# Patient Record
Sex: MALE | Race: BLACK OR AFRICAN AMERICAN
[De-identification: names, ages, dates, MRNs, and addresses within clinical notes are randomized per-mention and may not be internally consistent; named-entity substitution may affect disease eponyms.]

---

## 2019-07-15 ENCOUNTER — HOSPITAL ENCOUNTER (OUTPATIENT)
Dept: HOSPITAL 50 - VM.SDS | Age: 49
Discharge: HOME | End: 2019-07-15
Attending: SURGERY
Payer: COMMERCIAL

## 2019-07-15 DIAGNOSIS — D12.5: Primary | ICD-10-CM

## 2019-07-15 DIAGNOSIS — R73.03: ICD-10-CM

## 2019-07-15 PROCEDURE — 45384 COLONOSCOPY W/LESION REMOVAL: CPT

## 2019-07-15 NOTE — OR
PREOPERATIVE DIAGNOSIS:  Positive FIT test.

 

POSTOPERATIVE DIAGNOSIS:  Positive FIT test.

 

PROCEDURE PERFORMED:  Colonoscopy with polypectomy.

 

INDICATION:  The patient is a 49-year-old male with a history of positive FIT

test, presents for colonoscopy for further evaluation.

 

PROCEDURE IN DETAIL:  This was done in the procedure room.  Sedation was given

per Anesthesia.  He was placed in left lateral position.  First, a rectal exam

was done, was normal.  The scope was introduced in the rectum, slowly advanced

to the rectum, sigmoid, descending, transverse, and ascending colon until the

cecum was reached.  Upon reaching the cecum, the scope was slowly withdrawn with

normal mucosal surfaces on the way out.  No mucosal abnormalities, lesions, or

polyps were noted until approximately 20 cm where a small polyp was found.  It

was removed with hot forceps and sent to pathology.

 

FINAL DIAGNOSIS:  Polyp at 20 cm.

 

 

BKD:  07/15/2019 12:51:40  MODL:  07/15/2019 14:17:08

Job #:  [id number]